# Patient Record
Sex: FEMALE | HISPANIC OR LATINO | Employment: UNEMPLOYED | URBAN - METROPOLITAN AREA
[De-identification: names, ages, dates, MRNs, and addresses within clinical notes are randomized per-mention and may not be internally consistent; named-entity substitution may affect disease eponyms.]

---

## 2018-12-24 ENCOUNTER — OFFICE VISIT (OUTPATIENT)
Dept: URGENT CARE | Facility: CLINIC | Age: 43
End: 2018-12-24

## 2018-12-24 VITALS
HEIGHT: 64 IN | DIASTOLIC BLOOD PRESSURE: 76 MMHG | WEIGHT: 196 LBS | SYSTOLIC BLOOD PRESSURE: 120 MMHG | RESPIRATION RATE: 16 BRPM | OXYGEN SATURATION: 99 % | BODY MASS INDEX: 33.46 KG/M2 | HEART RATE: 83 BPM | TEMPERATURE: 97.4 F

## 2018-12-24 DIAGNOSIS — J06.9 UPPER RESPIRATORY TRACT INFECTION, UNSPECIFIED TYPE: ICD-10-CM

## 2018-12-24 LAB
FLUAV+FLUBV AG SPEC QL IA: NEGATIVE
INT CON NEG: NEGATIVE
INT CON POS: POSITIVE

## 2018-12-24 PROCEDURE — 99203 OFFICE O/P NEW LOW 30 MIN: CPT | Performed by: PHYSICIAN ASSISTANT

## 2018-12-24 PROCEDURE — 87804 INFLUENZA ASSAY W/OPTIC: CPT | Performed by: PHYSICIAN ASSISTANT

## 2018-12-24 RX ORDER — AZITHROMYCIN 250 MG/1
TABLET, FILM COATED ORAL
Qty: 6 TAB | Refills: 0 | Status: SHIPPED | OUTPATIENT
Start: 2018-12-24

## 2018-12-24 RX ORDER — BENZONATATE 100 MG/1
100-200 CAPSULE ORAL 3 TIMES DAILY PRN
Qty: 60 CAP | Refills: 0 | Status: SHIPPED | OUTPATIENT
Start: 2018-12-24

## 2018-12-24 ASSESSMENT — ENCOUNTER SYMPTOMS
SHORTNESS OF BREATH: 0
NAUSEA: 0
CHILLS: 1
MYALGIAS: 1
DIZZINESS: 0
VOMITING: 0
ABDOMINAL PAIN: 0
WHEEZING: 0
COUGH: 1
SPUTUM PRODUCTION: 0
FEVER: 1
SORE THROAT: 1
DIARRHEA: 0

## 2018-12-24 ASSESSMENT — COPD QUESTIONNAIRES: COPD: 0

## 2018-12-24 NOTE — PROGRESS NOTES
"Subjective:      Macy Kelly is a 43 y.o. female who presents with Fever (x1 week, fever, cough with chest and back pain, nausea)        Patient is accompanied by her daughter, who is helping as  throughout exam.     Cough   This is a new problem. The current episode started in the past 7 days (1 week). The problem has been unchanged. The problem occurs every few minutes. The cough is non-productive. Associated symptoms include chills, a fever, myalgias, nasal congestion, postnasal drip and a sore throat. Pertinent negatives include no chest pain, ear pain, shortness of breath or wheezing. The symptoms are aggravated by lying down. She has tried OTC cough suppressant for the symptoms. The treatment provided mild relief. There is no history of asthma, COPD or pneumonia.     Patient has not received a flu shot this year.     Review of Systems   Constitutional: Positive for chills and fever.   HENT: Positive for congestion, postnasal drip and sore throat. Negative for ear pain.    Respiratory: Positive for cough. Negative for sputum production, shortness of breath and wheezing.    Cardiovascular: Negative for chest pain.   Gastrointestinal: Negative for abdominal pain, diarrhea, nausea and vomiting.   Genitourinary: Negative.    Musculoskeletal: Positive for myalgias.   Neurological: Negative for dizziness.        Objective:     /76   Pulse 83   Temp 36.3 °C (97.4 °F) (Temporal)   Resp 16   Ht 1.626 m (5' 4\")   Wt 88.9 kg (196 lb)   SpO2 99%   BMI 33.64 kg/m²      Physical Exam   Constitutional: She is oriented to person, place, and time. She appears well-developed and well-nourished. No distress.   HENT:   Head: Normocephalic and atraumatic.   Right Ear: Hearing, tympanic membrane, external ear and ear canal normal.   Left Ear: Hearing, tympanic membrane, external ear and ear canal normal.   Mouth/Throat: Posterior oropharyngeal erythema present. No oropharyngeal exudate or posterior " oropharyngeal edema.   Eyes: Pupils are equal, round, and reactive to light. Conjunctivae are normal. Right eye exhibits no discharge. Left eye exhibits no discharge.   Neck: Normal range of motion.   Cardiovascular: Normal rate, regular rhythm and normal heart sounds.    No murmur heard.  Pulmonary/Chest: Effort normal and breath sounds normal. No respiratory distress. She has no wheezes.   Musculoskeletal: Normal range of motion.   Neurological: She is alert and oriented to person, place, and time.   Skin: Skin is warm and dry. She is not diaphoretic.   Psychiatric: She has a normal mood and affect. Her behavior is normal.   Nursing note and vitals reviewed.         PMH:  has a past medical history of Advanced maternal age in pregnancy (2010); History of  delivery (36 wk), currently pregnant (2010); and Supervision of high-risk pregnancy of elderly multigravida (2010).  MEDS:   Current Outpatient Prescriptions:   •  azithromycin (ZITHROMAX) 250 MG Tab, Take 2 tablets on day one, then 1 tablet on days two through five, Disp: 6 Tab, Rfl: 0  •  benzonatate (TESSALON) 100 MG Cap, Take 1-2 Caps by mouth 3 times a day as needed for Cough., Disp: 60 Cap, Rfl: 0  •  norethindrone (MICRONOR) 0.35 MG tablet, Take 1 Tab by mouth every day. (Patient not taking: Reported on 2018), Disp: 28 Tab, Rfl: 11  •  ibuprofen (MOTRIN) 800 MG TABS, Take 800 mg by mouth every 8 hours as needed., Disp: , Rfl:   •  oxycodone-acetaminophen (PERCOCET) 5-325 MG TABS, Take 1 Tab by mouth every four hours as needed., Disp: , Rfl:   •  docusate sodium (COLACE) 100 MG CAPS, Take 100 mg by mouth 2 times a day., Disp: , Rfl:   •  ferrous sulfate 325 (65 FE) MG tablet, Take 325 mg by mouth every day., Disp: , Rfl:   •  Prenatal Vit-Fe Fumarate-FA (PRENATAL PLUS) 27-1 MG TABS, Take 1 Tab by mouth every day., Disp: 90 Each, Rfl: 5  ALLERGIES:   Allergies   Allergen Reactions   • Nkda [No Known Drug Allergy]      SURGHX:    Past Surgical History:   Procedure Laterality Date   • PRIMARY C SECTION  8/11/2010    Performed by HAYDEN HERNANDEZ at [facility]     SOCHX:  reports that she has never smoked. She has never used smokeless tobacco. She reports that she does not drink alcohol or use drugs.  FH: family history includes Diabetes in her father, maternal aunt, sister, and sister.     Assessment/Plan:     1. Upper respiratory tract infection, unspecified type  - POCT Influenza A/B: NEGATIVE  - azithromycin (ZITHROMAX) 250 MG Tab; Take 2 tablets on day one, then 1 tablet on days two through five  Dispense: 6 Tab; Refill: 0   - Complete full course of antibiotics as prescribed   - benzonatate (TESSALON) 100 MG Cap; Take 1-2 Caps by mouth 3 times a day as needed for Cough.  Dispense: 60 Cap; Refill: 0    Call or return to office if symptoms persist or worsen, would recommend CXR at that time. The patient and her daughter demonstrated a good understanding and agreed with the treatment plan.

## 2018-12-26 ENCOUNTER — TELEPHONE (OUTPATIENT)
Dept: URGENT CARE | Facility: CLINIC | Age: 43
End: 2018-12-26

## 2018-12-27 NOTE — TELEPHONE ENCOUNTER
Attempted to return patient's phone call. She did not answer home phone number, unable to leave voice message. Mobile number was out of service. Will attempt to call back at another time.

## 2018-12-28 ENCOUNTER — TELEPHONE (OUTPATIENT)
Dept: URGENT CARE | Facility: PHYSICIAN GROUP | Age: 43
End: 2018-12-28

## 2018-12-28 NOTE — TELEPHONE ENCOUNTER
Again attempted to return patient's phone call. She did not answer home phone number and I was unable to leave a voice message. Mobile number is out of service.